# Patient Record
Sex: FEMALE | Employment: UNEMPLOYED | URBAN - METROPOLITAN AREA
[De-identification: names, ages, dates, MRNs, and addresses within clinical notes are randomized per-mention and may not be internally consistent; named-entity substitution may affect disease eponyms.]

---

## 2024-10-16 ENCOUNTER — OFFICE VISIT (OUTPATIENT)
Dept: FAMILY MEDICINE CLINIC | Facility: CLINIC | Age: 54
End: 2024-10-16
Payer: COMMERCIAL

## 2024-10-16 VITALS
SYSTOLIC BLOOD PRESSURE: 106 MMHG | OXYGEN SATURATION: 100 % | HEIGHT: 62 IN | BODY MASS INDEX: 26.28 KG/M2 | DIASTOLIC BLOOD PRESSURE: 74 MMHG | WEIGHT: 142.8 LBS | HEART RATE: 71 BPM | TEMPERATURE: 98 F

## 2024-10-16 DIAGNOSIS — R29.898 HIP TIGHTNESS: ICD-10-CM

## 2024-10-16 DIAGNOSIS — M25.562 CHRONIC PAIN OF BOTH KNEES: ICD-10-CM

## 2024-10-16 DIAGNOSIS — M25.561 CHRONIC PAIN OF BOTH KNEES: ICD-10-CM

## 2024-10-16 DIAGNOSIS — Z12.11 COLON CANCER SCREENING: ICD-10-CM

## 2024-10-16 DIAGNOSIS — G89.29 CHRONIC PAIN OF BOTH KNEES: ICD-10-CM

## 2024-10-16 DIAGNOSIS — M54.2 NECK PAIN: Primary | ICD-10-CM

## 2024-10-16 DIAGNOSIS — Z12.31 ENCOUNTER FOR SCREENING MAMMOGRAM FOR MALIGNANT NEOPLASM OF BREAST: ICD-10-CM

## 2024-10-16 PROCEDURE — 99203 OFFICE O/P NEW LOW 30 MIN: CPT | Performed by: FAMILY MEDICINE

## 2024-10-16 RX ORDER — LEVOTHYROXINE SODIUM 112 UG/1
112 TABLET ORAL DAILY
COMMUNITY

## 2024-10-16 NOTE — PROGRESS NOTES
New Patient Office Visit      Patient Name: Tara Vance  : 1970   MRN: [id number]     Chief Complaint:    Chief Complaint   Patient presents with    swollen finger     Patient was gardening a few months ago and hurt her R index finger. It was been swelling on and off. Certain movements will hurt it and its hard to bend it.     Patient would like to have some blood work done.        History of Present Illness: Tara Vance is a 54 y.o. female who is here today to establish care.  Patient has a history of thyroid cancer and is currently on thyroid medication for hypothyroidism.  Patient reports that her TSH runs slightly low and her T4 was slightly high after surgery.  Patient does not have an endocrinologist.  Had a thyroid checked recently    Patient is here with finger pain in the right index finger and also bilateral knee pain.  She has hip stiffness as well.  Patient says overall that she feels like her hips are not flexible enough.  She has knee pain with walking at times is on the lateral aspect of each knee.  Swelling of her knee.  Says that she was pulling weeds recently in the garden and hurt her finger.  This happened weeks ago.  Patient says it has improved slightly, but it swollen every day.  She has loss of range of motion.  She also reports chronic neck pain.  It hurts on the right side near her right shoulder kind of.  Radiation of pain.  No injury    Physical exam: Bilateral knee exam did not show effusion.  Patient had good patellar tracking with passive range of motion.  Tenderness at the pes anserine bilaterally.  Patient's hip range of motion was slightly restricted as far as external rotation.  Patient's right index finger was slightly edematous, and mild tenderness palpation of the PIP joint.    Subjective          Past Medical History:   Past Medical History:   Diagnosis Date    Allergic seasonal    History of medical problems 2012    Thyroid cancer    Hyperthyroidism      "Hypothyroidism 2012    After thyroidectomy       Past Surgical History:   Past Surgical History:   Procedure Laterality Date     SECTION  2009    THYROIDECTOMY  2012       Family History:   Family History   Problem Relation Age of Onset    Diabetes Mother        Social History:   Social History     Socioeconomic History    Marital status:    Tobacco Use    Smoking status: Never    Smokeless tobacco: Never   Vaping Use    Vaping status: Never Used   Substance and Sexual Activity    Alcohol use: Never    Drug use: Never    Sexual activity: Yes     Partners: Male     Birth control/protection: Post-menopausal       Medications:     Current Outpatient Medications:     levothyroxine (SYNTHROID, LEVOTHROID) 112 MCG tablet, Take 1 tablet by mouth Daily., Disp: , Rfl:     Allergies:   No Known Allergies    Objective     Physical Exam: Please see above  Vital Signs:   Vitals:    10/16/24 0833   BP: 106/74   Pulse: 71   Temp: 98 °F (36.7 °C)   TempSrc: Infrared   SpO2: 100%   Weight: 64.8 kg (142 lb 12.8 oz)   Height: 158 cm (62.21\")   PainSc:   2   PainLoc: Finger     Body mass index is 25.95 kg/m².       Assessment / Plan      Assessment/Plan:   Diagnoses and all orders for this visit:    1. Neck pain (Primary)  -     Ambulatory Referral to Physical Therapy for Evaluation & Treatment    2. Chronic pain of both knees  -     Ambulatory Referral to Physical Therapy for Evaluation & Treatment    3. Hip tightness  -     Ambulatory Referral to Physical Therapy for Evaluation & Treatment    4. Colon cancer screening  -     Cologuard - Stool, Per Rectum; Future    5. Encounter for screening mammogram for malignant neoplasm of breast  -     Mammo Diagnostic Digital Tomosynthesis Bilateral With CAD; Future         Send patient to physical therapy for her chronic knee pain, hip tightness, and neck pain.  Recheck recommendations.  May consider chiropractor in the future for her upper back/neck pain.  Colon cancer and " breast cancer discussed and ordered.    Patient can use over-the-counter medications for inflammation including NSAIDs and Tylenol.  Follow-up in 3 months for annual exam      Follow Up:   No follow-ups on file.    Jose Bonilla DO  [facility] [place]

## 2024-10-17 ENCOUNTER — TELEPHONE (OUTPATIENT)
Dept: FAMILY MEDICINE CLINIC | Facility: CLINIC | Age: 54
End: 2024-10-17
Payer: COMMERCIAL

## 2024-10-17 ENCOUNTER — PATIENT MESSAGE (OUTPATIENT)
Dept: FAMILY MEDICINE CLINIC | Facility: CLINIC | Age: 54
End: 2024-10-17
Payer: COMMERCIAL

## 2024-10-17 NOTE — TELEPHONE ENCOUNTER
Caller: Tara Vance    Relationship: Self    Best call back number:  [phone] (Home)     Who are you requesting to speak with (clinical staff, provider,  specific staff member): CLINICAL     What was the call regarding: PATIENT CALLED AND SAID SHE WOULD RATHER GO AHEAD AND DO THE COLONOSCOPY INSTEAD OF THE COLOGUARD TEST     PLEASE CALL

## 2024-10-31 ENCOUNTER — TREATMENT (OUTPATIENT)
Dept: PHYSICAL THERAPY | Facility: CLINIC | Age: 54
End: 2024-10-31
Payer: COMMERCIAL

## 2024-10-31 DIAGNOSIS — M25.652 HIP STIFFNESS, LEFT: Primary | ICD-10-CM

## 2024-10-31 DIAGNOSIS — M25.651 HIP STIFFNESS, RIGHT: ICD-10-CM

## 2024-10-31 NOTE — PROGRESS NOTES
Physical Therapy Initial Evaluation and Plan of Care    [facility] [place]  [address]  [place], [place] [address] [phone]    Patient: Tara Vance   : 1970  Diagnosis/ICD-10 Code:  No primary diagnosis found.  Referring practitioner: Jose Bonilla DO    Subjective Evaluation    History of Present Illness  Date of onset: 2021  Mechanism of injury: Chronic    Subjective comment: Has had chronic bilateral hip stiffness for the past few years.  Right hip is more stiff than the left.  No radiating pain.  Does have some infrequent stiffness along the right lateral knee.  No sleep disturbance.  No low back pain.  No numbness or tinglingi n the lower extremities.Pain  Aggravating factors: standing and ambulation    Social Support  Lives with: spouse and young children    Patient Goals  Patient goals for therapy: increased motion and increased strength           *LEFS:  76/80    Objective          Passive Range of Motion   Left Hip   Flexion: 125 degrees   Extension: 5 degrees   External rotation (90/90): 60 degrees   Internal rotation (90/90): 35 degrees     Right Hip   Flexion: 115 degrees   Extension: 10 degrees   External rotation (90/90): 50 degrees   Internal rotation (90/90): 40 degrees     Strength/Myotome Testing     Left Hip   Planes of Motion   Flexion: 4  Extension: 4-  Abduction: 4    Right Hip   Planes of Motion   Flexion: 4  Extension: 4-  Abduction: 4-          Assessment & Plan       Assessment  Impairments: abnormal or restricted ROM, activity intolerance, impaired physical strength and lacks appropriate home exercise program   Functional limitations: walking, standing and unable to perform repetitive tasks   Assessment details: Mrs. Vance is a very pleasant 54 year old female that presents to physical therapy with chronic bilateral hip stiffness (R>L).  Symptoms started insidiously 3 years ago.  PMH was covered and reviewed during interview.   Hip mobility is limited primarily into external rotation bilaterally.  Is lacking power in all directions in the right>left hip.  Needs focused care on improving bilateral hip strength and low load long duration stretching to reduce stiffness of bilateral hips.  Expect improvement in mobility and strength in the next 6-8 weeks.    Prognosis: good    Goals  Plan Goals: STGs:  1.)  Have at least 4/5 hip strength in all planes bilaterally in 6 weeks.  2.)  Have at least 60 deg of right hip external rotation mobility in 6 weeks.  LTGs:  1.)  Have 4+/5 hip strength in all planes bilaterally in 8 weeks.      Plan  Therapy options: will be seen for skilled therapy services  Planned therapy interventions: therapeutic activities, stretching, strengthening, manual therapy, abdominal trunk stabilization, balance/weight-bearing training, functional ROM exercises and home exercise program  Frequency: 2x month  Duration in visits: 4  Duration in weeks: 8  Treatment plan discussed with: patient        Manual Therapy:         mins  19913;  Therapeutic Exercise:    14     mins  53854;     Neuromuscular Esperanza:        mins  96258;    Therapeutic Activity:     9     mins  53453;     Gait Training:           mins  15241;     Ultrasound:          mins  44343;    Electrical Stimulation:         mins  48551 ( );  Dry Needling          mins self-pay    Timed Treatment:   23   mins   Total Treatment:     50   mins    PT SIGNATURE: Vijay Bernardo, PT   DATE TREATMENT INITIATED: 10/31/2024    Initial Certification  Certification Period: 1/29/2025  I certify that the therapy services are furnished while this patient is under my care.  The services outlined above are required by this patient, and will be reviewed every 90 days.     PHYSICIAN: Jose Bonilla DO  NPI: [id number]                                      DATE:    Please sign and return via fax to [phone].. Thank you, [facility].

## 2024-11-01 ENCOUNTER — TREATMENT (OUTPATIENT)
Dept: PHYSICAL THERAPY | Facility: CLINIC | Age: 54
End: 2024-11-01
Payer: COMMERCIAL

## 2024-11-01 DIAGNOSIS — S69.91XA INJURY OF FINGER OF RIGHT HAND, INITIAL ENCOUNTER: Primary | ICD-10-CM

## 2024-11-01 DIAGNOSIS — M79.644 PAIN OF FINGER OF RIGHT HAND: ICD-10-CM

## 2024-11-01 DIAGNOSIS — M25.641 DECREASED RANGE OF MOTION OF FINGER OF RIGHT HAND: Primary | ICD-10-CM

## 2024-11-01 DIAGNOSIS — R92.8 ABNORMAL MAMMOGRAM OF BOTH BREASTS: Primary | ICD-10-CM

## 2024-11-01 NOTE — PROGRESS NOTES
Physical Therapy Initial Evaluation and Plan of Care  [facility] [place]  [address] [address]        Patient: Tara Vance   : 1970  Diagnosis/ICD-10 Code:  Decreased range of motion of finger of right hand [M25.641]  Referring practitioner: Jose Bonilla DO  Date of Initial Visit: 2024  Today's Date: 2024  Patient seen for 1 sessions         Visit Diagnoses:    ICD-10-CM ICD-9-CM   1. Decreased range of motion of finger of right hand  M25.641 719.54   2. Pain of finger of right hand  M79.644 729.5         Subjective Questionnaire: QuickDASH: 22.73     Subjective Evaluation    History of Present Illness  Mechanism of injury: Back in July She was gardening and was pulling on some tough weeds, it was a little swollen back then but then got better. 3-4 weeks ago it started to get swollen after trying to open a jar. Notes tenderness from proximal IPJ to distal IPJ. Has been using benny tape to help keep the finger straight. Notes some discomfort into her forearm that she attributes to not using her finger. Twisting motions tend to bother her but don't cause her a ton of pain.    Ambidextrious uses R>L    Pain  At best pain ratin  At worst pain ratin             Objective          Observations     Additional Wrist/Hand Observation Details  Swelling of right index finger    Tenderness     Additional Tenderness Details  Tenderness over radial side of PIP to DIP  Tenderness with OP of PIP and DIP    Active Range of Motion     Right Digits   Flexion   Index     MCP: 100 degrees    PIP: 60 degrees    DIP: 30 degrees    Passive Range of Motion     Right Digits   Flexion   Index     MCP: 100 degrees    PIP: 70 degrees    DIP: 40 degrees  Extension   Index     PIP: 0 degrees    DIP: 0 degrees    Strength/Myotome Testing     Left Wrist/Hand     Thumb Strength  Key/Lateral Pinch     Trial 1: 16 lbs  Tip/Two-Point Pinch     Trial 1: 10 lbs  Palmar/Three-Point Pinch      Trial 1: 14 lbs    Right Wrist/Hand     Thumb Strength   Key/Lateral Pinch     Trial 1: 8 lbs  Tip/Two-Point Pinch     Trial 1: 6 lbs  Palmar/Three-Point Pinch     Trial 1: 8 lbs    Additional Strength Details  B intrinsics WNL          Assessment & Plan       Assessment  Impairments: abnormal or restricted ROM, activity intolerance, impaired physical strength and lacks appropriate home exercise program   Functional limitations: carrying objects, uncomfortable because of pain and unable to perform repetitive tasks   Assessment details: Patient presents with limited mobility and decreased pinch strength of right index finger likely due to chronic swelling from previous injury occurring back in July 2024. She was educated on prognosis of her condition and was provided with HEP. She will required skilled physical therapy to monitor her progress and improve strength/mobility of her right index finger.  Prognosis: good    Goals  Plan Goals: In six weeks, patient will:  1. Demonstrate independence with HEP for long term self management of her condition.   2. Increase R index finger PROM equal to contralateral index finger.  3. Demonstrate increased R pinch strengths equal to contralateral side.   4. Report >1/10 pain when opening jars to demonstrate improvement of her condition.  5. Demonstrate decreased TTP over radial aspect of right DIP and PIP.     Plan  Therapy options: will be seen for skilled therapy services  Planned modality interventions: cryotherapy, high voltage pulsed current (pain management), microcurrent electrical stimulation, TENS, thermotherapy (hydrocollator packs), thermotherapy (paraffin bath) and ultrasound  Planned therapy interventions: fine motor coordination training, functional ROM exercises, home exercise program, joint mobilization, manual therapy, motor coordination training, neuromuscular re-education, soft tissue mobilization, strengthening, stretching and therapeutic activities  Plan  details: Patient will be seen 1x every 2 weeks.         Timed:         Manual Therapy:         mins  64258;     Therapeutic Exercise:    15     mins  11106;     Neuromuscular Esperanza:        mins  09993;    Therapeutic Activity:          mins  88849;     Gait Training:           mins  08339;     Ultrasound:          mins  87550;    Ionto                                   mins   78653  Self Care                            mins   61514  Canalith Repos         mins 13930      Un-Timed:  Electrical Stimulation:         mins  11697 ( );  Dry Needling          mins self-pay  Traction          mins 45308  Low Eval     30     Mins  99999  Mod Eval          Mins  63997  High Eval                            Mins  80332        Timed Treatment:   15   mins   Total Treatment:     45   mins          PT: Suly Vance PT     License Number: [id number]  Electronically signed by Suly Vance PT, 11/01/24, 7:58 AM EDT    Initial Certification  Certification Period: 1/30/2025  I certify that the therapy services are furnished while this patient is under my care.  The services outlined above are required by this patient, and will be reviewed every 90 days.     PHYSICIAN: ________________________________________________________          DATE: ____________________________________________________________    Please sign and return via fax to @[phone]@.. Thank you, [facility].

## 2024-11-15 ENCOUNTER — TREATMENT (OUTPATIENT)
Dept: PHYSICAL THERAPY | Facility: CLINIC | Age: 54
End: 2024-11-15
Payer: COMMERCIAL

## 2024-11-15 DIAGNOSIS — M25.651 HIP STIFFNESS, RIGHT: ICD-10-CM

## 2024-11-15 DIAGNOSIS — M25.641 DECREASED RANGE OF MOTION OF FINGER OF RIGHT HAND: Primary | ICD-10-CM

## 2024-11-15 DIAGNOSIS — M79.644 PAIN OF FINGER OF RIGHT HAND: ICD-10-CM

## 2024-11-15 DIAGNOSIS — M25.652 HIP STIFFNESS, LEFT: Primary | ICD-10-CM

## 2024-11-15 PROCEDURE — 97035 APP MDLTY 1+ULTRASOUND EA 15: CPT

## 2024-11-15 PROCEDURE — 97110 THERAPEUTIC EXERCISES: CPT

## 2024-11-15 NOTE — PROGRESS NOTES
Physical Therapy Daily Progress Note    Patient: Tara Vance   : 1970  Diagnosis/ICD-10 Code:  Hip stiffness, left [M25.652]  Referring practitioner: Jose Bonilla DO  Date of Initial Visit: Type: THERAPY  Noted: 10/31/2024  Today's Date: 11/15/2024  Patient seen for 2 sessions         Tara Vance reports that her right knee feels better.  Hip stiffness is the same.      Subjective     Objective   See Exercise, Manual, and Modality Logs for complete treatment.       Assessment/Plan  Focused visit on improving bilateral hip mobility into MING via manual therapy and exercise.  Combined with improving proximal hip and posterior thigh strength.  Will f/u in 1 month for re-assessment.         Manual Therapy:    8     mins  69564;  Therapeutic Exercise:    10     mins  83217;     Neuromuscular Esperanza:        mins  69400;    Therapeutic Activity:     9     mins  45743;     Gait Training:           mins  93909;     Ultrasound:          mins  26560;    Electrical Stimulation:         mins  11820 (MC );  Dry Needling          mins self-pay    Timed Treatment:   27   mins   Total Treatment:     27   mins    Vijay Bernardo PT  Physical Therapist

## 2024-11-22 ENCOUNTER — PATIENT MESSAGE (OUTPATIENT)
Dept: FAMILY MEDICINE CLINIC | Facility: CLINIC | Age: 54
End: 2024-11-22
Payer: COMMERCIAL

## 2024-11-25 ENCOUNTER — PATIENT MESSAGE (OUTPATIENT)
Dept: FAMILY MEDICINE CLINIC | Facility: CLINIC | Age: 54
End: 2024-11-25
Payer: COMMERCIAL

## 2024-12-13 ENCOUNTER — TREATMENT (OUTPATIENT)
Dept: PHYSICAL THERAPY | Facility: CLINIC | Age: 54
End: 2024-12-13
Payer: COMMERCIAL

## 2024-12-13 DIAGNOSIS — M25.652 HIP STIFFNESS, LEFT: Primary | ICD-10-CM

## 2024-12-13 DIAGNOSIS — M79.644 PAIN OF FINGER OF RIGHT HAND: Primary | ICD-10-CM

## 2024-12-13 DIAGNOSIS — M25.651 HIP STIFFNESS, RIGHT: ICD-10-CM

## 2024-12-13 DIAGNOSIS — M25.641 DECREASED RANGE OF MOTION OF FINGER OF RIGHT HAND: ICD-10-CM

## 2024-12-13 PROCEDURE — 97530 THERAPEUTIC ACTIVITIES: CPT | Performed by: PHYSICAL THERAPIST

## 2024-12-13 PROCEDURE — 97140 MANUAL THERAPY 1/> REGIONS: CPT | Performed by: PHYSICAL THERAPIST

## 2024-12-13 PROCEDURE — 97110 THERAPEUTIC EXERCISES: CPT | Performed by: PHYSICAL THERAPIST

## 2024-12-13 NOTE — PROGRESS NOTES
Physical Therapy Daily Treatment Note  [facility] [place]  [address], [address]  [place] [address]       Patient: Tara Vance   : 1970  Diagnosis/ICD-10 Code:  Pain of finger of right hand [M79.644]  Referring practitioner: Jose Bonilla DO  Date of Initial Visit: Type: THERAPY  Noted: 2024  Today's Date: 2024  Patient seen for 3 sessions         Visit Diagnoses:    ICD-10-CM ICD-9-CM   1. Pain of finger of right hand  M79.644 729.5   2. Decreased range of motion of finger of right hand  M25.641 719.54       Subjective   Tara Vance reports: her finger has been doing good and she feels like she's back to normal.    Objective   100% composite fist    See Exercise, Manual, and Modality Logs for complete treatment.       Assessment/Plan  Patient demonstrates improvement of both mobility and painful symptoms.  She is able to make full composite fist.  Some tenderness in PIP joint that resolved with ultrasound application.  She is discharged at this time, she was instructed to reach out if function changes.  Other           Timed:         Manual Therapy:         mins  88291;     Therapeutic Exercise:         mins  77431;     Neuromuscular Esperanza:        mins  50461;    Therapeutic Activity:          mins  68535;     Gait Training:           mins  83595;     Ultrasound:     13     mins  24732;    Ionto                                   mins   35675  Self Care                            mins   10984  Canalith Repos         mins 38300      Un-Timed:  Electrical Stimulation:         mins  81385 ( );  Dry Needling          mins self-pay  Traction          mins 25643      Timed Treatment:   13   mins   Total Treatment:     13   mins      Suly Vnace PT  Physical Therapist

## 2024-12-13 NOTE — PROGRESS NOTES
Physical Therapy Daily Progress Note    Patient: Tara Vance   : 1970  Diagnosis/ICD-10 Code:  Hip stiffness, left [M25.652]  Referring practitioner: Jose Bonilla DO  Date of Initial Visit: Type: THERAPY  Noted: 10/31/2024  Today's Date: 2024  Patient seen for 3 sessions         Tara Vance reports feeling better since starting physical therapy.  Notes less hip stiffness bilaterally.  Has also been working on home self hip stretching.  She is traveling to [facility] for about 1 month to help her mother that is having a knee replacement.      Subjective     Objective   See Exercise, Manual, and Modality Logs for complete treatment.     *Hip aBd/ext MMT:  4+/5 each direction, bilaterally    Assessment/Plan  Mrs. Vance has attended physical therapy for a total of 3 visits for chronic bilateral hip stiffness and weakness.  Has improved proximal hip strength bilaterally.  Has had excellent joint mobility, with restrictions in extra-capsular stiffness.  Added higher level loading exercises today.  Will f/u in 1 month once she returns from [facility].             Manual Therapy:    9     mins  87744;  Therapeutic Exercise:    10     mins  22142;     Neuromuscular Esperanza:        mins  56244;    Therapeutic Activity:     10     mins  42286;     Gait Training:           mins  57112;     Ultrasound:          mins  15502;    Electrical Stimulation:         mins  73502 ( );  Dry Needling          mins self-pay    Timed Treatment:   29   mins   Total Treatment:     29   mins    Vijay Bernardo, ED  Physical Therapist

## 2025-02-07 RX ORDER — SODIUM, POTASSIUM,MAG SULFATES 17.5-3.13G
1 SOLUTION, RECONSTITUTED, ORAL ORAL TAKE AS DIRECTED
Qty: 354 ML | Refills: 0 | Status: SHIPPED | OUTPATIENT
Start: 2025-02-07

## 2025-02-11 ENCOUNTER — TREATMENT (OUTPATIENT)
Dept: PHYSICAL THERAPY | Facility: CLINIC | Age: 55
End: 2025-02-11
Payer: COMMERCIAL

## 2025-02-11 DIAGNOSIS — M25.552 BILATERAL HIP PAIN: Primary | ICD-10-CM

## 2025-02-11 DIAGNOSIS — M25.551 BILATERAL HIP PAIN: Primary | ICD-10-CM

## 2025-02-11 PROCEDURE — 97530 THERAPEUTIC ACTIVITIES: CPT | Performed by: PHYSICAL THERAPIST

## 2025-02-11 PROCEDURE — 97112 NEUROMUSCULAR REEDUCATION: CPT | Performed by: PHYSICAL THERAPIST

## 2025-02-11 PROCEDURE — 97110 THERAPEUTIC EXERCISES: CPT | Performed by: PHYSICAL THERAPIST

## 2025-02-11 NOTE — PROGRESS NOTES
Physical Therapy Daily Progress Note    Patient: Tara Vance   : 1970  Diagnosis/ICD-10 Code:  No primary diagnosis found.  Referring practitioner: Jose Bonilla DO  Date of Initial Visit: Type: THERAPY  Noted: 10/31/2024  Today's Date: 2025  Patient seen for 4 sessions         Tara Vance reports that she has not had any pain in her hips.  Right knee feels better as well.  Walked a lot in [place] and had less pain doing so.        Subjective     Objective   See Exercise, Manual, and Modality Logs for complete treatment.     *PROM hip flx/IR/ER:  R:  133 deg/41 deg/68 deg; L:  135 deg/40 deg/71 deg  *Hip ext/aBd MMT:  5/5 each direction bilaterally    Assessment/Plan  Mrs. Vance has attended physical therapy for a total of 6 visits for chronic bilateral hip pain.  Has full passive mobility in all planes without pain bilaterally.  Combined with full strength in all abduction and extension without pain.  Focused visit on improving general balance, thigh strength and LQ mm endurance  Will plan to discharge to [facility] today.  Will be happy to see Mrs. Vance back should any symptoms return.           Manual Therapy:         mins  00502;  Therapeutic Exercise:    24     mins  76249;     Neuromuscular Esperanza:    9    mins  30208;    Therapeutic Activity:     9     mins  57409;     Gait Training:           mins  51766;     Ultrasound:         mins  57172;    Electrical Stimulation:         mins  41116 ( );  Dry Needling          mins self-pay    Timed Treatment:   42   mins   Total Treatment:     42   mins    Vijay Bernardo, PT  Physical Therapist

## 2025-03-21 RX ORDER — SODIUM, POTASSIUM,MAG SULFATES 17.5-3.13G
1 SOLUTION, RECONSTITUTED, ORAL ORAL TAKE AS DIRECTED
Qty: 354 ML | Refills: 0 | Status: SHIPPED | OUTPATIENT
Start: 2025-03-21

## 2025-03-25 ENCOUNTER — TELEPHONE (OUTPATIENT)
Dept: GASTROENTEROLOGY | Facility: CLINIC | Age: 55
End: 2025-03-25

## 2025-03-25 NOTE — TELEPHONE ENCOUNTER
Hub staff attempted to follow warm transfer process and was unsuccessful     Caller: Tara Vance    Relationship to patient: Self    Best call back number: [phone]    Patient is needing: PATIENT IS NEEDING TO CANCEL PROCEDURE SCHEDULED FOR 04/02/2025. WILL BE GOING OUT OF THE COUNTRY, WILL REACH OUT WHEN RETURNING TO [place]CHEDULE.

## 2025-05-28 ENCOUNTER — TELEPHONE (OUTPATIENT)
Dept: FAMILY MEDICINE CLINIC | Facility: CLINIC | Age: 55
End: 2025-05-28
Payer: COMMERCIAL

## 2025-05-28 NOTE — TELEPHONE ENCOUNTER
Caller: Tara Vance    Relationship: Self    Best call back number: [phone]     What is the best time to reach you: ANYTIME    Who are you requesting to speak with (clinical staff, provider,  specific staff member): CLINICAL    Do you know the name of the person who called: PATIENT    What was the call regarding: PATIENT WOULD LIKE TO HAVE LABS COMPLETED BEFORE THE PHYSICAL ON 6.4.25, AND NEEDS TO DISCUSS OTHER TESTING NEEDED FOR A YEARLY PHYSICAL.  PLEASE CALL TO DISCUSS TESTING NEEDED.    Is it okay if the provider responds through [facility]: PREFERS CALL

## 2025-06-18 ENCOUNTER — OFFICE VISIT (OUTPATIENT)
Dept: FAMILY MEDICINE CLINIC | Facility: CLINIC | Age: 55
End: 2025-06-18
Payer: COMMERCIAL

## 2025-06-18 ENCOUNTER — LAB (OUTPATIENT)
Dept: LAB | Facility: HOSPITAL | Age: 55
End: 2025-06-18
Payer: COMMERCIAL

## 2025-06-18 VITALS
TEMPERATURE: 98.4 F | HEIGHT: 62 IN | BODY MASS INDEX: 27.46 KG/M2 | OXYGEN SATURATION: 100 % | WEIGHT: 149.2 LBS | DIASTOLIC BLOOD PRESSURE: 80 MMHG | SYSTOLIC BLOOD PRESSURE: 116 MMHG | HEART RATE: 78 BPM

## 2025-06-18 DIAGNOSIS — Z00.00 ANNUAL PHYSICAL EXAM: ICD-10-CM

## 2025-06-18 DIAGNOSIS — Z12.11 COLON CANCER SCREENING: ICD-10-CM

## 2025-06-18 DIAGNOSIS — Z00.00 ANNUAL PHYSICAL EXAM: Primary | ICD-10-CM

## 2025-06-18 LAB
25(OH)D3 SERPL-MCNC: 42.9 NG/ML (ref 30–100)
ALBUMIN SERPL-MCNC: 4.6 G/DL (ref 3.5–5.2)
ALBUMIN UR-MCNC: <1.2 MG/DL
ALBUMIN/GLOB SERPL: 1.6 G/DL
ALP SERPL-CCNC: 87 U/L (ref 39–117)
ALT SERPL W P-5'-P-CCNC: 23 U/L (ref 1–33)
ANION GAP SERPL CALCULATED.3IONS-SCNC: 13.6 MMOL/L (ref 5–15)
AST SERPL-CCNC: 26 U/L (ref 1–32)
BILIRUB SERPL-MCNC: 0.2 MG/DL (ref 0–1.2)
BUN SERPL-MCNC: 13 MG/DL (ref 6–20)
BUN/CREAT SERPL: 18.1 (ref 7–25)
CALCIUM SPEC-SCNC: 9.3 MG/DL (ref 8.6–10.5)
CHLORIDE SERPL-SCNC: 105 MMOL/L (ref 98–107)
CHOLEST SERPL-MCNC: 235 MG/DL (ref 0–200)
CO2 SERPL-SCNC: 24.4 MMOL/L (ref 22–29)
CREAT SERPL-MCNC: 0.72 MG/DL (ref 0.57–1)
CREAT UR-MCNC: 16.2 MG/DL
DEPRECATED RDW RBC AUTO: 40.6 FL (ref 37–54)
EGFRCR SERPLBLD CKD-EPI 2021: 100.7 ML/MIN/1.73
ERYTHROCYTE [DISTWIDTH] IN BLOOD BY AUTOMATED COUNT: 12.7 % (ref 12.3–15.4)
GLOBULIN UR ELPH-MCNC: 2.8 GM/DL
GLUCOSE SERPL-MCNC: 97 MG/DL (ref 65–99)
HBA1C MFR BLD: 5.7 % (ref 4.8–5.6)
HCT VFR BLD AUTO: 41.3 % (ref 34–46.6)
HDLC SERPL-MCNC: 83 MG/DL (ref 40–60)
HGB BLD-MCNC: 13.6 G/DL (ref 12–15.9)
LDLC SERPL CALC-MCNC: 135 MG/DL (ref 0–100)
LDLC/HDLC SERPL: 1.59 {RATIO}
MCH RBC QN AUTO: 29.3 PG (ref 26.6–33)
MCHC RBC AUTO-ENTMCNC: 32.9 G/DL (ref 31.5–35.7)
MCV RBC AUTO: 89 FL (ref 79–97)
MICROALBUMIN/CREAT UR: NORMAL MG/G{CREAT}
PLATELET # BLD AUTO: 297 10*3/MM3 (ref 140–450)
PMV BLD AUTO: 10.7 FL (ref 6–12)
POTASSIUM SERPL-SCNC: 4.2 MMOL/L (ref 3.5–5.2)
PROT SERPL-MCNC: 7.4 G/DL (ref 6–8.5)
RBC # BLD AUTO: 4.64 10*6/MM3 (ref 3.77–5.28)
SODIUM SERPL-SCNC: 143 MMOL/L (ref 136–145)
TRIGL SERPL-MCNC: 100 MG/DL (ref 0–150)
TSH SERPL DL<=0.05 MIU/L-ACNC: 1.34 UIU/ML (ref 0.27–4.2)
VLDLC SERPL-MCNC: 17 MG/DL (ref 5–40)
WBC NRBC COR # BLD AUTO: 5.95 10*3/MM3 (ref 3.4–10.8)

## 2025-06-18 PROCEDURE — 82306 VITAMIN D 25 HYDROXY: CPT

## 2025-06-18 PROCEDURE — 80061 LIPID PANEL: CPT

## 2025-06-18 PROCEDURE — 82043 UR ALBUMIN QUANTITATIVE: CPT

## 2025-06-18 PROCEDURE — 83036 HEMOGLOBIN GLYCOSYLATED A1C: CPT

## 2025-06-18 PROCEDURE — 80050 GENERAL HEALTH PANEL: CPT

## 2025-06-18 PROCEDURE — 82570 ASSAY OF URINE CREATININE: CPT

## 2025-06-18 RX ORDER — LEVOTHYROXINE SODIUM 100 UG/1
100 TABLET ORAL DAILY
COMMUNITY

## 2025-06-18 NOTE — PROGRESS NOTES
"     Follow Up Office Visit      Patient Name: Tara Vance  : 1970   MRN: [id number]     Chief Complaint:    Chief Complaint   Patient presents with    Annual Exam       History of Present Illness: Tara Vance is a 54 y.o. female who is here today to follow up with hypothyroidism in patients here today for annual exam.  For patient's annual exam we discussed health maintenance topics such as vaccines, cervical cancer screening, colon cancer screening.  Discussed breast cancer screening.  Patient reports having breast cancer screening and cervical cancer screening in [place].  She did not do the colonoscopy because she got sick that week.  Patient defers vaccines today but we did discuss Prevnar and Tdap vaccines.    Encouraged patient to exercise regularly in the form of strength training.  Encouraged her to eat a healthy diet such as whole grains, vegetables and control portion sizes.  Reviewed health maintenance topics as above.  Encouraged her to stay up-to-date with dental care.      Physical exam: Patient's mood and affect is appropriate.  Neurological exam grossly intact.  Heart exam RRR.  Lungs CTA bilateral.  No murmurs or rhonchi noted on exam      Subjective        I have reviewed and the following portions of the patient's history were updated as appropriate: past family history, past medical history, past social history, past surgical history and problem list.    Medications:     Current Outpatient Medications:     levothyroxine (SYNTHROID, LEVOTHROID) 100 MCG tablet, Take 1 tablet by mouth Daily., Disp: , Rfl:     Allergies:   No Known Allergies    Objective     Physical Exam: Please see above  Vital Signs:   Vitals:    25 0945   BP: 116/80   Pulse: 78   Temp: 98.4 °F (36.9 °C)   TempSrc: Infrared   SpO2: 100%   Weight: 67.7 kg (149 lb 3.2 oz)   Height: 157.5 cm (62\")   PainSc: 0-No pain     Body mass index is 27.29 kg/m².          Assessment / Plan      Assessment/Plan:   Diagnoses and all " orders for this visit:    1. Annual physical exam (Primary)  -     Comprehensive Metabolic Panel; Future  -     Lipid Panel; Future  -     TSH Rfx On Abnormal To Free T4; Future  -     Microalbumin / Creatinine Urine Ratio - Urine, Clean Catch; Future  -     Vitamin D,25-Hydroxy; Future  -     Hemoglobin A1c; Future  -     CBC (No Diff); Future    2. Colon cancer screening  -     Cologuard - Stool, Per Rectum; Future    Annual exam counseling: Counseled patient regards to cancer screening such as cervical cancer screening, breast cancer screening and colon cancer.  We ordered Cologuard.  Discussed ASCVD risk, checking lipids and labs.  Also discussed diet and exercise.  Encouraged her to eat a healthy diet as far as abundance of whole grains and vegetables and control portion sizes.  Encouraged her to workout on a regular basis as far as strength training.    Follow Up:   Return in about 1 year (around 6/18/2026) for Labs today, Annual.    Jose Bonilla DO  [facility] [place]

## 2025-06-19 ENCOUNTER — RESULTS FOLLOW-UP (OUTPATIENT)
Dept: FAMILY MEDICINE CLINIC | Facility: CLINIC | Age: 55
End: 2025-06-19
Payer: COMMERCIAL